# Patient Record
Sex: MALE | URBAN - METROPOLITAN AREA
[De-identification: names, ages, dates, MRNs, and addresses within clinical notes are randomized per-mention and may not be internally consistent; named-entity substitution may affect disease eponyms.]

---

## 2019-06-17 ENCOUNTER — EMERGENCY (EMERGENCY)
Facility: HOSPITAL | Age: 24
LOS: 1 days | Discharge: HOME | End: 2019-06-17
Attending: STUDENT IN AN ORGANIZED HEALTH CARE EDUCATION/TRAINING PROGRAM
Payer: SELF-PAY

## 2019-06-17 VITALS
SYSTOLIC BLOOD PRESSURE: 149 MMHG | WEIGHT: 149.91 LBS | DIASTOLIC BLOOD PRESSURE: 87 MMHG | RESPIRATION RATE: 16 BRPM | TEMPERATURE: 99 F | HEIGHT: 62 IN | HEART RATE: 70 BPM | OXYGEN SATURATION: 98 %

## 2019-06-17 PROCEDURE — 99053 MED SERV 10PM-8AM 24 HR FAC: CPT

## 2019-06-17 PROCEDURE — 82962 GLUCOSE BLOOD TEST: CPT

## 2019-06-17 PROCEDURE — 99285 EMERGENCY DEPT VISIT HI MDM: CPT | Mod: 25

## 2019-06-17 PROCEDURE — 99283 EMERGENCY DEPT VISIT LOW MDM: CPT | Mod: 25

## 2019-06-17 RX ADMIN — Medication 75 MILLIGRAM(S): at 21:02

## 2019-06-17 NOTE — ED PROVIDER NOTE - OBJECTIVE STATEMENT
24 y.o sent in from police station for concern for withdrawel. patient in custody. 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 last drink at 2pm. denies cp, sob, tremor, weakness, abd pain, fever, chills or cough. denies delusion, si/hi.

## 2019-06-17 NOTE — ED ADULT NURSE REASSESSMENT NOTE - NS ED NURSE REASSESS COMMENT FT1
/82   HR   68   O2 98   RR 18    98.2 Patient left ED in stable condition , no signs or symptoms of alcohol withdrawal. Patient left ED accompanied by  [place] ID # [id number].

## 2019-06-17 NOTE — ED PROVIDER NOTE - CLINICAL SUMMARY MEDICAL DECISION MAKING FREE TEXT BOX
patient presenting with concern for withdrawel. vital stable. no signs of trauma. neuro intact. no signs of distress. will give librium. return precautiion. clinically not in withdrawel, stable for discharge

## 2019-12-10 NOTE — ED ADULT NURSE NOTE - NS_NURSE_DISC_TEACHING_YN_ED_ALL_ED
11:00 The patient arrived for a Lexiscan Cardiolite stress test.  The procedure, risks, and benefits were discussed with the patient,and the consent was signed.  A saline lock was started,and the Cardiolite was injected by x-ray.  The patient was taken to the waiting area, to await resting images at 11:45.  12:40 The patient returned from x-ray and was prepped for the stress test.   Dr. Bocanegra arrived, and the patient was administered the Lexiscan per procedure.  The patient  complained of feeling lightheaded, with a heavy body, and slight nausea.These symptoms resolved soon after the injection was done. He was given coffee a snack and taken to x-ray in stable condition for stress images.  The saline lock will be removed by x-ray for proper disposal.  The patient was instructed that the ordering MD will call with results in one to two days.  Please see the chart for the complete test results.     
Yes